# Patient Record
Sex: FEMALE | Race: WHITE | NOT HISPANIC OR LATINO | Employment: FULL TIME | URBAN - METROPOLITAN AREA
[De-identification: names, ages, dates, MRNs, and addresses within clinical notes are randomized per-mention and may not be internally consistent; named-entity substitution may affect disease eponyms.]

---

## 2022-01-11 ENCOUNTER — APPOINTMENT (OUTPATIENT)
Dept: ULTRASOUND IMAGING | Facility: HOSPITAL | Age: 27
End: 2022-01-11

## 2022-01-11 ENCOUNTER — HOSPITAL ENCOUNTER (EMERGENCY)
Facility: HOSPITAL | Age: 27
Discharge: HOME | End: 2022-01-11
Attending: EMERGENCY MEDICINE | Admitting: EMERGENCY MEDICINE

## 2022-01-11 VITALS
RESPIRATION RATE: 16 BRPM | SYSTOLIC BLOOD PRESSURE: 114 MMHG | HEART RATE: 79 BPM | WEIGHT: 195.77 LBS | DIASTOLIC BLOOD PRESSURE: 66 MMHG | BODY MASS INDEX: 33.42 KG/M2 | OXYGEN SATURATION: 97 % | HEIGHT: 64 IN | TEMPERATURE: 98.3 F

## 2022-01-11 DIAGNOSIS — O03.4 INCOMPLETE ABORTION: Primary | ICD-10-CM

## 2022-01-11 LAB
ABO GROUP BLD: NORMAL
BASOPHILS # BLD AUTO: 0.1 10*3/MM3 (ref 0–0.2)
BASOPHILS NFR BLD AUTO: 0.6 % (ref 0–1.5)
DEPRECATED RDW RBC AUTO: 38.1 FL (ref 37–54)
EOSINOPHIL # BLD AUTO: 0 10*3/MM3 (ref 0–0.4)
EOSINOPHIL NFR BLD AUTO: 0.3 % (ref 0.3–6.2)
ERYTHROCYTE [DISTWIDTH] IN BLOOD BY AUTOMATED COUNT: 12.3 % (ref 12.3–15.4)
HCG INTACT+B SERPL-ACNC: NORMAL MIU/ML
HCT VFR BLD AUTO: 36.3 % (ref 34–46.6)
HGB BLD-MCNC: 12.7 G/DL (ref 12–15.9)
LYMPHOCYTES # BLD AUTO: 1.9 10*3/MM3 (ref 0.7–3.1)
LYMPHOCYTES NFR BLD AUTO: 16.5 % (ref 19.6–45.3)
MCH RBC QN AUTO: 29.7 PG (ref 26.6–33)
MCHC RBC AUTO-ENTMCNC: 34.9 G/DL (ref 31.5–35.7)
MCV RBC AUTO: 85.3 FL (ref 79–97)
MONOCYTES # BLD AUTO: 0.7 10*3/MM3 (ref 0.1–0.9)
MONOCYTES NFR BLD AUTO: 6.1 % (ref 5–12)
NEUTROPHILS NFR BLD AUTO: 76.5 % (ref 42.7–76)
NEUTROPHILS NFR BLD AUTO: 8.7 10*3/MM3 (ref 1.7–7)
NRBC BLD AUTO-RTO: 0.1 /100 WBC (ref 0–0.2)
NUMBER OF DOSES: NORMAL
PLATELET # BLD AUTO: 306 10*3/MM3 (ref 140–450)
PMV BLD AUTO: 6.9 FL (ref 6–12)
RBC # BLD AUTO: 4.26 10*6/MM3 (ref 3.77–5.28)
RH BLD: POSITIVE
WBC NRBC COR # BLD: 11.4 10*3/MM3 (ref 3.4–10.8)

## 2022-01-11 PROCEDURE — 84702 CHORIONIC GONADOTROPIN TEST: CPT | Performed by: EMERGENCY MEDICINE

## 2022-01-11 PROCEDURE — 93976 VASCULAR STUDY: CPT

## 2022-01-11 PROCEDURE — 76817 TRANSVAGINAL US OBSTETRIC: CPT

## 2022-01-11 PROCEDURE — 76801 OB US < 14 WKS SINGLE FETUS: CPT

## 2022-01-11 PROCEDURE — 85025 COMPLETE CBC W/AUTO DIFF WBC: CPT | Performed by: EMERGENCY MEDICINE

## 2022-01-11 PROCEDURE — 86900 BLOOD TYPING SEROLOGIC ABO: CPT | Performed by: EMERGENCY MEDICINE

## 2022-01-11 PROCEDURE — 99283 EMERGENCY DEPT VISIT LOW MDM: CPT

## 2022-01-11 PROCEDURE — 86901 BLOOD TYPING SEROLOGIC RH(D): CPT | Performed by: EMERGENCY MEDICINE

## 2022-01-12 NOTE — DISCHARGE INSTRUCTIONS
Follow-up with your OB/GYN as scheduled.  Return to the emergency room for any new or worsening symptoms, bleeding more than one pad per hour, or if you have any other questions or concerns.

## 2022-01-12 NOTE — ED NOTES
Patient states she is 5-6 weeks pregnant.  She feels very weak, faint.  Bleeding started two days ago.  She is not wearing a pad at this time.  Blood is brownish to dark red. No strenuous activity around time bleeding starting.  Patient denies cramping.  She has been nauseous entire pregnancy. Appetite has decreased along with oral intake of fluids.     Didi Amos RN  01/11/22 2050

## 2022-01-12 NOTE — ED PROVIDER NOTES
"Subjective   Chief complaint: Vaginal bleeding    26-year-old female  at approximately 6 weeks gestation presents with vaginal bleeding.  Patient states she has had bleeding over the past 2 days.  Bleeding has been similar to a period.  She denies any abdominal or pelvic pain.  She has had some generalized weakness.  She states she was supposed to have her first OB appointment in 2 days.      History provided by:  Patient      Review of Systems   Constitutional: Negative for fever.   HENT: Negative for congestion.    Respiratory: Negative for cough and shortness of breath.    Cardiovascular: Negative for chest pain.   Gastrointestinal: Negative for abdominal pain, diarrhea and vomiting.   Genitourinary: Positive for vaginal bleeding. Negative for dysuria.   Musculoskeletal: Negative for back pain.   Neurological: Positive for weakness. Negative for headaches.   Psychiatric/Behavioral: Negative for confusion.       No past medical history on file.    Allergies   Allergen Reactions   • Penicillins Hives       No past surgical history on file.    No family history on file.    Social History     Socioeconomic History   • Marital status: Single       /56 (BP Location: Left arm, Patient Position: Lying)   Pulse 78   Temp 98.3 °F (36.8 °C)   Resp 18   Ht 162.6 cm (64\")   Wt 88.8 kg (195 lb 12.3 oz)   SpO2 99%   BMI 33.60 kg/m²       Objective   Physical Exam  Vitals and nursing note reviewed.   Constitutional:       Appearance: Normal appearance. She is well-developed.   HENT:      Head: Normocephalic and atraumatic.   Eyes:      Pupils: Pupils are equal, round, and reactive to light.   Cardiovascular:      Rate and Rhythm: Normal rate and regular rhythm.      Heart sounds: Normal heart sounds.   Pulmonary:      Effort: Pulmonary effort is normal. No respiratory distress.      Breath sounds: Normal breath sounds.   Abdominal:      General: Bowel sounds are normal.      Palpations: Abdomen is soft.      " Tenderness: There is no abdominal tenderness.   Musculoskeletal:         General: Normal range of motion.      Cervical back: Normal range of motion and neck supple.   Skin:     General: Skin is warm and dry.   Neurological:      General: No focal deficit present.      Mental Status: She is alert and oriented to person, place, and time.         Procedures           ED Course      Results for orders placed or performed during the hospital encounter of 22   hCG, Quantitative, Pregnancy    Specimen: Blood   Result Value Ref Range    HCG Quantitative 30,069.00 mIU/mL   CBC Auto Differential    Specimen: Blood   Result Value Ref Range    WBC 11.40 (H) 3.40 - 10.80 10*3/mm3    RBC 4.26 3.77 - 5.28 10*6/mm3    Hemoglobin 12.7 12.0 - 15.9 g/dL    Hematocrit 36.3 34.0 - 46.6 %    MCV 85.3 79.0 - 97.0 fL    MCH 29.7 26.6 - 33.0 pg    MCHC 34.9 31.5 - 35.7 g/dL    RDW 12.3 12.3 - 15.4 %    RDW-SD 38.1 37.0 - 54.0 fl    MPV 6.9 6.0 - 12.0 fL    Platelets 306 140 - 450 10*3/mm3    Neutrophil % 76.5 (H) 42.7 - 76.0 %    Lymphocyte % 16.5 (L) 19.6 - 45.3 %    Monocyte % 6.1 5.0 - 12.0 %    Eosinophil % 0.3 0.3 - 6.2 %    Basophil % 0.6 0.0 - 1.5 %    Neutrophils, Absolute 8.70 (H) 1.70 - 7.00 10*3/mm3    Lymphocytes, Absolute 1.90 0.70 - 3.10 10*3/mm3    Monocytes, Absolute 0.70 0.10 - 0.90 10*3/mm3    Eosinophils, Absolute 0.00 0.00 - 0.40 10*3/mm3    Basophils, Absolute 0.10 0.00 - 0.20 10*3/mm3    nRBC 0.1 0.0 - 0.2 /100 WBC    RhIg Evaluation    Specimen: Blood   Result Value Ref Range    ABO Type A     RH type Positive    Doses of Rh Immune Globulin    Specimen: Blood   Result Value Ref Range    Number of Doses       RhIg is not indicated due to the patient's Rh status     US Ob < 14 Weeks Single or First Gestation    Result Date: 2022   1. Single intrauterine gestation with a crown-rump length correlating with ultrasound age of 7 weeks 3 days but no fetal heart tones, concerning for failed  pregnancy/miscarriage. Recommend correlating with serial beta hCG and short-term follow-up pelvic ultrasound. 2. Unremarkable ultrasound appearance of the ovaries.  Electronically Signed By-Jelani Carter MD On:2022 10:26 PM This report was finalized on  by  Jelani Carter MD.    US Ob Transvaginal    Result Date: 2022   1. Single intrauterine gestation with a crown-rump length correlating with ultrasound age of 7 weeks 3 days but no fetal heart tones, concerning for failed pregnancy/miscarriage. Recommend correlating with serial beta hCG and short-term follow-up pelvic ultrasound. 2. Unremarkable ultrasound appearance of the ovaries.  Electronically Signed By-Jelani Carter MD On:2022 10:26 PM This report was finalized on  by  Jelani Carter MD.                                               MDM   Patient had the above evaluation.  Results were discussed with the patient.  Pregnancy ultrasound is showing a single intrauterine gestation measuring 7 weeks 3 days but with no fetal heart tones concerning for failed pregnancy/miscarriage.  Patient is Rh+.  She already has an appointment to see her OB/GYN in 2 days.  She is stable for discharge.      Final diagnoses:   Incomplete        ED Disposition  ED Disposition     ED Disposition Condition Comment    Discharge Stable           Madelyn Duran MD  [facility] [place] [id number] [phone]    Go in 2 days           Medication List      No changes were made to your prescriptions during this visit.          Camacho Rothman MD  [phone]

## 2022-02-05 ENCOUNTER — HOSPITAL ENCOUNTER (OUTPATIENT)
Facility: HOSPITAL | Age: 27
Setting detail: OBSERVATION
Discharge: HOME | End: 2022-02-06
Attending: EMERGENCY MEDICINE | Admitting: EMERGENCY MEDICINE

## 2022-02-05 ENCOUNTER — APPOINTMENT (OUTPATIENT)
Dept: ULTRASOUND IMAGING | Facility: HOSPITAL | Age: 27
End: 2022-02-05

## 2022-02-05 DIAGNOSIS — O03.9 SPONTANEOUS ABORTION: ICD-10-CM

## 2022-02-05 DIAGNOSIS — N93.9 VAGINAL BLEEDING: Primary | ICD-10-CM

## 2022-02-05 LAB
ABO GROUP BLD: NORMAL
ALBUMIN SERPL-MCNC: 4.2 G/DL (ref 3.5–5.2)
ALBUMIN/GLOB SERPL: 1.4 G/DL
ALP SERPL-CCNC: 71 U/L (ref 39–117)
ALT SERPL W P-5'-P-CCNC: 16 U/L (ref 1–33)
ANION GAP SERPL CALCULATED.3IONS-SCNC: 12 MMOL/L (ref 5–15)
AST SERPL-CCNC: 16 U/L (ref 1–32)
BACTERIA UR QL AUTO: ABNORMAL /HPF
BASOPHILS # BLD AUTO: 0 10*3/MM3 (ref 0–0.2)
BASOPHILS NFR BLD AUTO: 0.3 % (ref 0–1.5)
BILIRUB SERPL-MCNC: 0.3 MG/DL (ref 0–1.2)
BILIRUB UR QL STRIP: NEGATIVE
BLD GP AB SCN SERPL QL: NEGATIVE
BUN SERPL-MCNC: 11 MG/DL (ref 6–20)
BUN/CREAT SERPL: 18.3 (ref 7–25)
CALCIUM SPEC-SCNC: 9.2 MG/DL (ref 8.6–10.5)
CHLORIDE SERPL-SCNC: 101 MMOL/L (ref 98–107)
CLARITY UR: CLEAR
CO2 SERPL-SCNC: 26 MMOL/L (ref 22–29)
COLOR UR: YELLOW
CREAT SERPL-MCNC: 0.6 MG/DL (ref 0.57–1)
DEPRECATED RDW RBC AUTO: 40.3 FL (ref 37–54)
EOSINOPHIL # BLD AUTO: 0.1 10*3/MM3 (ref 0–0.4)
EOSINOPHIL NFR BLD AUTO: 0.6 % (ref 0.3–6.2)
ERYTHROCYTE [DISTWIDTH] IN BLOOD BY AUTOMATED COUNT: 12.9 % (ref 12.3–15.4)
GFR SERPL CREATININE-BSD FRML MDRD: 123 ML/MIN/1.73
GLOBULIN UR ELPH-MCNC: 3 GM/DL
GLUCOSE SERPL-MCNC: 93 MG/DL (ref 65–99)
GLUCOSE UR STRIP-MCNC: NEGATIVE MG/DL
HCG INTACT+B SERPL-ACNC: 806.7 MIU/ML
HCT VFR BLD AUTO: 36.1 % (ref 34–46.6)
HGB BLD-MCNC: 12.4 G/DL (ref 12–15.9)
HGB UR QL STRIP.AUTO: ABNORMAL
HYALINE CASTS UR QL AUTO: ABNORMAL /LPF
KETONES UR QL STRIP: ABNORMAL
LEUKOCYTE ESTERASE UR QL STRIP.AUTO: ABNORMAL
LYMPHOCYTES # BLD AUTO: 1.5 10*3/MM3 (ref 0.7–3.1)
LYMPHOCYTES NFR BLD AUTO: 15.1 % (ref 19.6–45.3)
MCH RBC QN AUTO: 30.4 PG (ref 26.6–33)
MCHC RBC AUTO-ENTMCNC: 34.4 G/DL (ref 31.5–35.7)
MCV RBC AUTO: 88.3 FL (ref 79–97)
MONOCYTES # BLD AUTO: 0.8 10*3/MM3 (ref 0.1–0.9)
MONOCYTES NFR BLD AUTO: 8.3 % (ref 5–12)
NEUTROPHILS NFR BLD AUTO: 7.5 10*3/MM3 (ref 1.7–7)
NEUTROPHILS NFR BLD AUTO: 75.7 % (ref 42.7–76)
NITRITE UR QL STRIP: NEGATIVE
NRBC BLD AUTO-RTO: 0 /100 WBC (ref 0–0.2)
PH UR STRIP.AUTO: 6.5 [PH] (ref 5–8)
PLATELET # BLD AUTO: 288 10*3/MM3 (ref 140–450)
PMV BLD AUTO: 6.8 FL (ref 6–12)
POTASSIUM SERPL-SCNC: 4 MMOL/L (ref 3.5–5.2)
PROT SERPL-MCNC: 7.2 G/DL (ref 6–8.5)
PROT UR QL STRIP: ABNORMAL
RBC # BLD AUTO: 4.09 10*6/MM3 (ref 3.77–5.28)
RBC # UR STRIP: ABNORMAL /HPF
REF LAB TEST METHOD: ABNORMAL
RH BLD: POSITIVE
SARS-COV-2 RNA PNL SPEC NAA+PROBE: NOT DETECTED
SODIUM SERPL-SCNC: 139 MMOL/L (ref 136–145)
SP GR UR STRIP: 1.02 (ref 1–1.03)
SQUAMOUS #/AREA URNS HPF: ABNORMAL /HPF
T&S EXPIRATION DATE: NORMAL
UROBILINOGEN UR QL STRIP: ABNORMAL
WBC # UR STRIP: ABNORMAL /HPF
WBC NRBC COR # BLD: 10 10*3/MM3 (ref 3.4–10.8)

## 2022-02-05 PROCEDURE — G0378 HOSPITAL OBSERVATION PER HR: HCPCS

## 2022-02-05 PROCEDURE — 86900 BLOOD TYPING SEROLOGIC ABO: CPT | Performed by: NURSE PRACTITIONER

## 2022-02-05 PROCEDURE — 84702 CHORIONIC GONADOTROPIN TEST: CPT | Performed by: NURSE PRACTITIONER

## 2022-02-05 PROCEDURE — 80053 COMPREHEN METABOLIC PANEL: CPT | Performed by: NURSE PRACTITIONER

## 2022-02-05 PROCEDURE — 85025 COMPLETE CBC W/AUTO DIFF WBC: CPT | Performed by: NURSE PRACTITIONER

## 2022-02-05 PROCEDURE — C9803 HOPD COVID-19 SPEC COLLECT: HCPCS

## 2022-02-05 PROCEDURE — 76817 TRANSVAGINAL US OBSTETRIC: CPT

## 2022-02-05 PROCEDURE — 81001 URINALYSIS AUTO W/SCOPE: CPT | Performed by: NURSE PRACTITIONER

## 2022-02-05 PROCEDURE — 87635 SARS-COV-2 COVID-19 AMP PRB: CPT | Performed by: EMERGENCY MEDICINE

## 2022-02-05 PROCEDURE — 86850 RBC ANTIBODY SCREEN: CPT | Performed by: NURSE PRACTITIONER

## 2022-02-05 PROCEDURE — 93976 VASCULAR STUDY: CPT

## 2022-02-05 PROCEDURE — 96360 HYDRATION IV INFUSION INIT: CPT

## 2022-02-05 PROCEDURE — 99284 EMERGENCY DEPT VISIT MOD MDM: CPT

## 2022-02-05 PROCEDURE — 86901 BLOOD TYPING SEROLOGIC RH(D): CPT | Performed by: NURSE PRACTITIONER

## 2022-02-05 PROCEDURE — P9612 CATHETERIZE FOR URINE SPEC: HCPCS

## 2022-02-05 RX ORDER — CHOLECALCIFEROL (VITAMIN D3) 125 MCG
5 CAPSULE ORAL NIGHTLY PRN
Status: DISCONTINUED | OUTPATIENT
Start: 2022-02-05 | End: 2022-02-06 | Stop reason: HOSPADM

## 2022-02-05 RX ORDER — ONDANSETRON 2 MG/ML
4 INJECTION INTRAMUSCULAR; INTRAVENOUS EVERY 6 HOURS PRN
Status: DISCONTINUED | OUTPATIENT
Start: 2022-02-05 | End: 2022-02-06 | Stop reason: HOSPADM

## 2022-02-05 RX ORDER — SODIUM CHLORIDE 0.9 % (FLUSH) 0.9 %
10 SYRINGE (ML) INJECTION EVERY 12 HOURS SCHEDULED
Status: DISCONTINUED | OUTPATIENT
Start: 2022-02-05 | End: 2022-02-06 | Stop reason: HOSPADM

## 2022-02-05 RX ORDER — SODIUM CHLORIDE 0.9 % (FLUSH) 0.9 %
10 SYRINGE (ML) INJECTION AS NEEDED
Status: DISCONTINUED | OUTPATIENT
Start: 2022-02-05 | End: 2022-02-06 | Stop reason: HOSPADM

## 2022-02-05 RX ORDER — ACETAMINOPHEN 325 MG/1
650 TABLET ORAL EVERY 4 HOURS PRN
Status: DISCONTINUED | OUTPATIENT
Start: 2022-02-05 | End: 2022-02-06 | Stop reason: HOSPADM

## 2022-02-05 RX ORDER — SODIUM CHLORIDE 9 MG/ML
100 INJECTION, SOLUTION INTRAVENOUS CONTINUOUS
Status: DISCONTINUED | OUTPATIENT
Start: 2022-02-05 | End: 2022-02-06 | Stop reason: HOSPADM

## 2022-02-05 RX ADMIN — SODIUM CHLORIDE 100 ML/HR: 9 INJECTION, SOLUTION INTRAVENOUS at 23:22

## 2022-02-05 RX ADMIN — SODIUM CHLORIDE 1000 ML: 9 INJECTION, SOLUTION INTRAVENOUS at 20:49

## 2022-02-05 RX ADMIN — Medication 10 ML: at 23:22

## 2022-02-05 NOTE — ED PROVIDER NOTES
"Subjective    Chief Complaint   Patient presents with   • Vaginal Bleeding     Pt reports miscarriage on 01/09, states she took \"medication to pass the fetus\" on 01/18.  Pt states she passed it today and has had severe vaginal bleeding, saturating 2-3 pads per hour.       Nikko Valle Jr., MD  No LMP recorded. (Menstrual status: Other).  Allergies   Allergen Reactions   • Penicillins Hives       History obtained by patient  Patient is a 26-year-old female presents to the emergency department with complaint of vaginal bleeding.  Patient reports she has had vaginal bleeding intermittently since January 9.  She reports that time she was seen here in the ED, was diagnosed with a miscarriage.  She followed up with her obstetrician, Dr. Cesar, was given a vaginal medication to \"pass\" but was left.  She reports that on 1/24 she still had an hCG level that was elevated per her obstetrician's office.  She reports that today she had worsening bleeding, and passed tissue at home.  She denies any fever or chills.  She does report feeling dizzy and lightheaded.  She reports at times she feels nauseated.  No vomiting or diarrhea.            Review of Systems   Constitutional: Negative for chills and fever.   Respiratory: Negative for shortness of breath.    Cardiovascular: Negative for chest pain.   Gastrointestinal: Positive for nausea. Negative for abdominal pain, diarrhea and vomiting.   Genitourinary: Positive for pelvic pain and vaginal bleeding. Negative for decreased urine volume, difficulty urinating, dysuria, enuresis, flank pain, frequency, genital sores, hematuria, menstrual problem, urgency, vaginal discharge and vaginal pain.   Skin: Negative for rash.   Neurological: Positive for dizziness and light-headedness. Negative for syncope.       No past medical history on file.    Allergies   Allergen Reactions   • Penicillins Hives       No past surgical history on file.    No family history on file.    Social " History     Socioeconomic History   • Marital status: Single           Objective   Physical Exam  Vitals and nursing note reviewed. Exam conducted with a chaperone present.   Constitutional:       General: She is not in acute distress.     Appearance: Normal appearance. She is not ill-appearing, toxic-appearing or diaphoretic.   HENT:      Head: Normocephalic and atraumatic.      Nose: Nose normal.      Mouth/Throat:      Mouth: Mucous membranes are moist.      Pharynx: Oropharynx is clear.   Eyes:      Extraocular Movements: Extraocular movements intact.      Conjunctiva/sclera: Conjunctivae normal.      Pupils: Pupils are equal, round, and reactive to light.   Cardiovascular:      Rate and Rhythm: Normal rate and regular rhythm.      Heart sounds: Normal heart sounds. No murmur heard.  No friction rub. No gallop.    Pulmonary:      Effort: No respiratory distress.      Breath sounds: Normal breath sounds. No stridor. No wheezing, rhonchi or rales.   Chest:      Chest wall: No tenderness.   Abdominal:      General: Bowel sounds are normal.      Palpations: Abdomen is soft.   Genitourinary:     Comments: She was placed in the lithotomy position external genitalia were found to have no lesions or swelling.  Speculum exam shows clot noted to be coming out of the cervix, with blood in the vaginal vault.  This was cleared with two 4 x 4 gauze and ring forceps, clot was easily removed with ring forceps.  No severe hemorrhage noted.  The patient had no cervical motion tenderness.  Patient had no adnexal tenderness.  Chaperoned with ALLEN Arellano at the bedside.  Musculoskeletal:         General: Normal range of motion.      Cervical back: Normal range of motion and neck supple.   Skin:     General: Skin is warm and dry.      Capillary Refill: Capillary refill takes less than 2 seconds.   Neurological:      Mental Status: She is alert and oriented to person, place, and time.   Psychiatric:         Mood and Affect: Mood  "normal.         Procedures           ED Course  ED Course as of 02/05/22 2108   Sat Feb 05, 2022   1937 Patient an ultrasound [LB]      ED Course User Index  [LB] Yajaira Crystal, APRN           /72 (BP Location: Left arm, Patient Position: Lying)   Pulse 74   Temp 98.4 °F (36.9 °C) (Oral)   Resp 16   Ht 162.6 cm (64\")   Wt 90.4 kg (199 lb 4.7 oz)   SpO2 100%   Breastfeeding Unknown Comment: Recent miscarriage on 01/09  BMI 34.21 kg/m²   Labs Reviewed   URINALYSIS W/ CULTURE IF INDICATED - Abnormal; Notable for the following components:       Result Value    Ketones, UA Trace (*)     Blood, UA Small (1+) (*)     Protein, UA Trace (*)     Leuk Esterase, UA Trace (*)     All other components within normal limits   CBC WITH AUTO DIFFERENTIAL - Abnormal; Notable for the following components:    Lymphocyte % 15.1 (*)     Neutrophils, Absolute 7.50 (*)     All other components within normal limits   URINALYSIS, MICROSCOPIC ONLY - Abnormal; Notable for the following components:    WBC, UA 0-2 (*)     Bacteria, UA Trace (*)     Squamous Epithelial Cells, UA 3-6 (*)     All other components within normal limits   COVID PRE-OP / PRE-PROCEDURE SCREENING ORDER (NO ISOLATION)    Narrative:     The following orders were created for panel order COVID PRE-OP / PRE-PROCEDURE SCREENING ORDER (NO ISOLATION) - Swab, Nasopharynx.  Procedure                               Abnormality         Status                     ---------                               -----------         ------                     COVID-19,CEPHEID/DIEGO,CO...[[id number]]                      In process                   Please view results for these tests on the individual orders.   COVID-19,CEPHEID/DIEGO,COR/SHIRA/PAD/MAURICIO IN-HOUSE,NP SWAB IN TRANSPORT MEDIA 3-4 HR TAT, RT-PCR   COMPREHENSIVE METABOLIC PANEL    Narrative:     GFR Normal >60  Chronic Kidney Disease <60  Kidney Failure <15     HCG, QUANTITATIVE, PREGNANCY    Narrative:     HCG Ranges by " Gestational Age    Females - non-pregnant premenopausal   </= 1mIU/mL HCG  Females - postmenopausal               </= 7mIU/mL HCG    3 Weeks         5.8 -    71.2 mIU/mL  4 Weeks         9.5 -     750 mIU/mL  5 Weeks         217 -   7,138 mIU/mL  6 Weeks         158 -  31,795 mIU/mL  7 Weeks       3,697 - 163,563 mIU/mL  8 Weeks      32,065 - 149,571 mIU/mL  9 Weeks      63,803 - 151,410 mIU/mL  10 Weeks     46,509 - 186,977 mIU/mL  12 Weeks     27,832 - 210,612 mIU/mL  14 Weeks     13,950 -  62,530 mIU/mL  15 Weeks     12,039 -  70,971 mIU/mL  16 Weeks      9,040 -  56,451 mIU/mL  17 Weeks      8,175 -  55,868 mIU/mL  18 Weeks      8,099 -  58,176 mIU/mL  Results may be falsely decreased if patient taking Biotin.     TYPE AND SCREEN   BB ARMBAND CHECK   CBC AND DIFFERENTIAL    Narrative:     The following orders were created for panel order CBC & Differential.  Procedure                               Abnormality         Status                     ---------                               -----------         ------                     CBC Auto Differential[[id number]]        Abnormal            Final result                 Please view results for these tests on the individual orders.     Medications   sodium chloride 0.9 % flush 10 mL (has no administration in time range)   sodium chloride 0.9 % bolus 1,000 mL (1,000 mL Intravenous New Bag 22)     US Ob Transvaginal    Result Date: 2022  1. Echogenic blood clot within the lower uterine segment and cervix likely related to ongoing  progress. 2. No residual gestational sac or apparent retained products of conception. 3. Normal-appearing ovaries with color Doppler flow. No free fluid.  Recommend continued clinical follow-up and follow-up ultrasound as clinically needed.  Electronically Signed By-Darrick Ulloa MD On:2022 8:04 PM This report was finalized on  by  Darrick Ulloa MD.                                           MDM  Appropriate PPE was worn during the duration of the care for this patient while in the emergency department per [facility] Policy    Chart Review--> patient seen here 2022 for pregnancy with vaginal bleeding.  Diagnosed with incomplete  at that time, Rh+.  She was discharged home.    ----Differentials-->  in progress, retained products of conception, sepsis  This list is not all inclusive and does not constitute the entireity of considered causes.     ----ED  Course-->Patient was brought back to the emergency department room for evaluation and placed on appropriate monitoring.    Patient had IV established and blood work obtained    Labs--> hCG 8 of 6.70.  CMP unremarkable.  CBC unremarkable, specifically hemoglobin 12.4, hematocrit 36.1.  UA negative.  Patient is Rh+ per her previous visit.    Was given 1 L of IV fluids.    Please see pelvic exam.    ----Radiology and imaging orders as above, interpreted per ED physician and/or radiologist----> ultrasound as above.    ----Consults--> Dr. Cesar, GYN.  We discussed patient, presentation, and work-up, given that patient is orthostatic, blood pressure 90/54 standing, with tachycardia 109, patient be placed in the ED observation unit for overnight monitoring, hydration, repeat CBC in the morning and GYN consult.    Patient agrees to the current treatment plan.    Vital signs have  been reviewed. Patient is afebrile. Non toxic in appearance. Alert and oriented to person, place, time and situation.                          Final diagnoses:   Vaginal bleeding   Spontaneous        ED Disposition  ED Disposition     ED Disposition Condition Comment    Decision to Admit            No follow-up provider specified.       Medication List      No changes were made to your prescriptions during this visit.          Yajaira Crystal, APRN  [id number]

## 2022-02-06 VITALS
RESPIRATION RATE: 16 BRPM | TEMPERATURE: 98.3 F | SYSTOLIC BLOOD PRESSURE: 108 MMHG | HEART RATE: 102 BPM | OXYGEN SATURATION: 98 % | DIASTOLIC BLOOD PRESSURE: 74 MMHG | WEIGHT: 187.83 LBS | HEIGHT: 64 IN | BODY MASS INDEX: 32.07 KG/M2

## 2022-02-06 PROBLEM — N93.9 VAGINAL BLEEDING: Status: RESOLVED | Noted: 2022-02-05 | Resolved: 2022-02-06

## 2022-02-06 LAB
ANION GAP SERPL CALCULATED.3IONS-SCNC: 9 MMOL/L (ref 5–15)
BASOPHILS # BLD AUTO: 0.1 10*3/MM3 (ref 0–0.2)
BASOPHILS NFR BLD AUTO: 0.6 % (ref 0–1.5)
BUN SERPL-MCNC: 13 MG/DL (ref 6–20)
BUN/CREAT SERPL: 25.5 (ref 7–25)
CALCIUM SPEC-SCNC: 8.6 MG/DL (ref 8.6–10.5)
CHLORIDE SERPL-SCNC: 104 MMOL/L (ref 98–107)
CO2 SERPL-SCNC: 24 MMOL/L (ref 22–29)
CREAT SERPL-MCNC: 0.51 MG/DL (ref 0.57–1)
DEPRECATED RDW RBC AUTO: 40.7 FL (ref 37–54)
EOSINOPHIL # BLD AUTO: 0.1 10*3/MM3 (ref 0–0.4)
EOSINOPHIL NFR BLD AUTO: 0.5 % (ref 0.3–6.2)
ERYTHROCYTE [DISTWIDTH] IN BLOOD BY AUTOMATED COUNT: 12.9 % (ref 12.3–15.4)
GFR SERPL CREATININE-BSD FRML MDRD: 148 ML/MIN/1.73
GLUCOSE SERPL-MCNC: 86 MG/DL (ref 65–99)
HCT VFR BLD AUTO: 29.7 % (ref 34–46.6)
HCT VFR BLD AUTO: 31.9 % (ref 34–46.6)
HGB BLD-MCNC: 10.2 G/DL (ref 12–15.9)
HGB BLD-MCNC: 11 G/DL (ref 12–15.9)
LYMPHOCYTES # BLD AUTO: 2.2 10*3/MM3 (ref 0.7–3.1)
LYMPHOCYTES NFR BLD AUTO: 21.2 % (ref 19.6–45.3)
MCH RBC QN AUTO: 31 PG (ref 26.6–33)
MCHC RBC AUTO-ENTMCNC: 34.5 G/DL (ref 31.5–35.7)
MCV RBC AUTO: 89.9 FL (ref 79–97)
MONOCYTES # BLD AUTO: 0.8 10*3/MM3 (ref 0.1–0.9)
MONOCYTES NFR BLD AUTO: 7.5 % (ref 5–12)
NEUTROPHILS NFR BLD AUTO: 7.3 10*3/MM3 (ref 1.7–7)
NEUTROPHILS NFR BLD AUTO: 70.2 % (ref 42.7–76)
NRBC BLD AUTO-RTO: 0.1 /100 WBC (ref 0–0.2)
PLATELET # BLD AUTO: 261 10*3/MM3 (ref 140–450)
PMV BLD AUTO: 7.2 FL (ref 6–12)
POTASSIUM SERPL-SCNC: 4.1 MMOL/L (ref 3.5–5.2)
RBC # BLD AUTO: 3.55 10*6/MM3 (ref 3.77–5.28)
SODIUM SERPL-SCNC: 137 MMOL/L (ref 136–145)
WBC NRBC COR # BLD: 10.4 10*3/MM3 (ref 3.4–10.8)

## 2022-02-06 PROCEDURE — 85025 COMPLETE CBC W/AUTO DIFF WBC: CPT | Performed by: EMERGENCY MEDICINE

## 2022-02-06 PROCEDURE — 80048 BASIC METABOLIC PNL TOTAL CA: CPT | Performed by: EMERGENCY MEDICINE

## 2022-02-06 PROCEDURE — 36415 COLL VENOUS BLD VENIPUNCTURE: CPT | Performed by: EMERGENCY MEDICINE

## 2022-02-06 PROCEDURE — G0378 HOSPITAL OBSERVATION PER HR: HCPCS

## 2022-02-06 PROCEDURE — 85018 HEMOGLOBIN: CPT | Performed by: NURSE PRACTITIONER

## 2022-02-06 PROCEDURE — 96361 HYDRATE IV INFUSION ADD-ON: CPT

## 2022-02-06 PROCEDURE — 85014 HEMATOCRIT: CPT | Performed by: NURSE PRACTITIONER

## 2022-02-06 RX ORDER — DOXYCYCLINE HYCLATE 50 MG/1
324 CAPSULE, GELATIN COATED ORAL
Qty: 30 TABLET | Refills: 1 | Status: SHIPPED | OUTPATIENT
Start: 2022-02-06

## 2022-02-06 RX ADMIN — Medication 10 ML: at 08:36

## 2022-02-06 RX ADMIN — SODIUM CHLORIDE 100 ML/HR: 9 INJECTION, SOLUTION INTRAVENOUS at 08:45

## 2022-02-06 NOTE — H&P
"[facility] H&P    Patient Name: Cornelia Paulson  : 1995  MRN: [id number]  Primary Care Physician: Nikko Valle Jr., MD  Date of admission: 2022     Patient Care Team:  Nikko Valle Jr., MD as PCP - General (Family Medicine)          Subjective   History Present Illness     Chief Complaint:   Chief Complaint   Patient presents with   • Vaginal Bleeding     Pt reports miscarriage on , states she took \"medication to pass the fetus\" on .  Pt states she passed it today and has had severe vaginal bleeding, saturating 2-3 pads per hour.           Ms. Paulson is a 26 y.o.  presents to [facility] complaining of       26-year-old female presents to the ER with a chief complaint of vaginal bleeding with blood clots and likely expelled products of conception.  The patient was seen at this facility's ER on 2022 with her uterine gestation proximately 7 weeks and 3 days but no fetal heart tones concerning for miscarriage.  Patient noted to be Rh+. The patient reports follow-up with OB/GYN 2022 at which time the patient reports she was was given medicine to assist with expelling products of conception.  The patient states that she had some heavy bleeding which later eased up.  However, yesterday she passed a large blood clot which looked like it had tissue attached and subsequent moderate-sized blood clot with what looked like a small fetus attached.  She continued to have heavy vaginal bleeding and began to feel dizzy and lightheaded at which time she came to the emergency room for valuation.  This a.m. the patient reports that her vaginal bleeding has greatly decreased.  The patient has had 1 prior pregnancy with a daughter.    Review of records with summary: Patient seen in this facility's ER       Review of Systems   Gastrointestinal: Negative for abdominal pain, hematochezia, melena, nausea and vomiting.   Genitourinary: Negative for dysuria.        Vaginal " bleeding   All other systems reviewed and are negative.          Personal History     Past Medical History:   Past Medical History:   Diagnosis Date   • Endometriosis        Surgical History:    No past surgical history on file.        Family History: family history is not on file. Otherwise pertinent FHx was reviewed and unremarkable.     Social History:  reports that she has never smoked. She does not have any smokeless tobacco history on file. She reports that she does not use drugs.      Medications: none       Allergies:    Allergies   Allergen Reactions   • Penicillins Hives       Objective   Objective     Vital Signs  Temp:  [98.1 °F (36.7 °C)-98.6 °F (37 °C)] 98.6 °F (37 °C)  Heart Rate:  [] 70  Resp:  [15-16] 16  BP: ()/(36-78) 105/66  SpO2:  [98 %-100 %] 98 %  on   ;   Device (Oxygen Therapy): room air  Body mass index is 32.24 kg/m².    Physical Exam  Vitals and nursing note reviewed.   Constitutional:       Appearance: Normal appearance. She is not ill-appearing.   HENT:      Head: Normocephalic and atraumatic.      Right Ear: External ear normal.      Left Ear: External ear normal.      Nose: Nose normal. No congestion or rhinorrhea.      Mouth/Throat:      Mouth: Mucous membranes are moist.   Eyes:      General: No scleral icterus.        Right eye: No discharge.         Left eye: No discharge.      Extraocular Movements: Extraocular movements intact.      Conjunctiva/sclera: Conjunctivae normal.      Pupils: Pupils are equal, round, and reactive to light.   Cardiovascular:      Rate and Rhythm: Normal rate and regular rhythm.      Pulses: Normal pulses.      Heart sounds: Normal heart sounds.   Pulmonary:      Effort: Pulmonary effort is normal.      Breath sounds: Normal breath sounds.   Abdominal:      General: Bowel sounds are normal.      Palpations: Abdomen is soft.   Musculoskeletal:         General: Normal range of motion.      Cervical back: Normal range of motion and neck supple.    Skin:     General: Skin is warm and dry.      Capillary Refill: Capillary refill takes less than 2 seconds.      Coloration: Skin is not pale.   Neurological:      General: No focal deficit present.      Mental Status: She is alert and oriented to person, place, and time.   Psychiatric:         Mood and Affect: Mood normal.         Behavior: Behavior normal.         Thought Content: Thought content normal.         Judgment: Judgment normal.           Results Review:  I have personally reviewed most recent lab results and radiology images and interpretations and agree with findings.    Results from last 7 days   Lab Units 02/06/22  0303   WBC 10*3/mm3 10.40   HEMOGLOBIN g/dL 11.0*   HEMATOCRIT % 31.9*   PLATELETS 10*3/mm3 261     Results from last 7 days   Lab Units 02/06/22  0303 02/05/22  1913 02/05/22  1913   SODIUM mmol/L 137   < > 139   POTASSIUM mmol/L 4.1   < > 4.0   CHLORIDE mmol/L 104   < > 101   CO2 mmol/L 24.0   < > 26.0   BUN mg/dL 13   < > 11   CREATININE mg/dL 0.51*   < > 0.60   GLUCOSE mg/dL 86   < > 93   CALCIUM mg/dL 8.6   < > 9.2   ALT (SGPT) U/L  --   --  16   AST (SGOT) U/L  --   --  16    < > = values in this interval not displayed.     Estimated Creatinine Clearance: 179.6 mL/min (A) (by C-G formula based on SCr of 0.51 mg/dL (L)).  Brief Urine Lab Results  (Last result in the past 365 days)      Color   Clarity   Blood   Leuk Est   Nitrite   Protein   CREAT   Urine HCG        02/05/22 2000 Yellow  Comment: Result checked    Clear   Small (1+)   Trace   Negative   Trace                 Microbiology Results (last 10 days)     Procedure Component Value - Date/Time    COVID PRE-OP / PRE-PROCEDURE SCREENING ORDER (NO ISOLATION) - Swab, Nasopharynx [[id number]]  (Normal) Collected: 02/05/22 2105    Lab Status: Final result Specimen: Swab from Nasopharynx Updated: 02/05/22 2155    Narrative:      The following orders were created for panel order COVID PRE-OP / PRE-PROCEDURE SCREENING ORDER (NO  ISOLATION) - Swab, Nasopharynx.  Procedure                               Abnormality         Status                     ---------                               -----------         ------                     COVID-19,CEPHEID/DIEGO,CO...[[id number]]  Normal              Final result                 Please view results for these tests on the individual orders.    COVID-19,CEPHEID/DIEGO,COR/SHIRA/PAD/MAURICIO IN-HOUSE(OR EMERGENT/ADD-ON),NP SWAB IN TRANSPORT MEDIA 3-4 HR TAT, RT-PCR - Swab, Nasopharynx [[id number]]  (Normal) Collected: 22    Lab Status: Final result Specimen: Swab from Nasopharynx Updated: 22     COVID19 Not Detected    Narrative:      Fact sheet for providers: https://www.fda.gov/media/169542/download     Fact sheet for patients: https://www.fda.gov/media/112392/download  Fact sheet for providers: https://www.fda.gov/media/319170/download    Fact sheet for patients: https://www.fda.gov/media/492201/download    Test performed by PCR.          ECG/EMG Results (most recent)     None                  US Ob Transvaginal    Result Date: 2022  1. Echogenic blood clot within the lower uterine segment and cervix likely related to ongoing  progress. 2. No residual gestational sac or apparent retained products of conception. 3. Normal-appearing ovaries with color Doppler flow. No free fluid.  Recommend continued clinical follow-up and follow-up ultrasound as clinically needed.  Electronically Signed By-Darrick Ulloa MD On:2022 8:04 PM This report was finalized on  by  Darrick Ulloa MD.        Estimated Creatinine Clearance: 179.6 mL/min (A) (by C-G formula based on SCr of 0.51 mg/dL (L)).    Assessment/Plan   Assessment/Plan       Active Hospital Problems    Diagnosis  POA   • Vaginal bleeding [N93.9]  Yes      Resolved Hospital Problems   No resolved problems to display.       Vaginal bleeding with recent spontaneous : OB/GYN consult; serial  hemoglobin    Dizziness--likley secondary to volume deficit related to blood loss: IV fluids; Prepeat orthostatic vital signs    Anemia, mild, hemoglobin 11.4--secondary to vaginal blood loss with comparison 12.4 on 2/5/2022: Repeat H&H at noon      VTE Prophylaxis -   Mechanical Order History:      Ordered        02/05/22 2216  Place Sequential Compression Device  Once            02/05/22 2216  Maintain Sequential Compression Device  Continuous                    Pharmalogical Order History:     None          CODE STATUS:    Code Status and Medical Interventions:   Ordered at: 02/05/22 2053     Level Of Support Discussed With:    Patient     Code Status (Patient has no pulse and is not breathing):    CPR (Attempt to Resuscitate)     Medical Interventions (Patient has pulse or is breathing):    Full Support       This patient has been examined wearing personal protective equipment.     I discussed the patient's findings and my recommendations with patient and nursing staff.      Signature:Electronically signed by YUNIOR Sotelo, 02/06/22, 9:01 AM EST.

## 2022-02-06 NOTE — PLAN OF CARE
Problem: Adult Inpatient Plan of Care  Goal: Plan of Care Review  Outcome: Ongoing, Progressing  Flowsheets (Taken 2/6/2022 1304)  Progress: improving  Plan of Care Reviewed With:   patient   father  Outcome Summary: patient admitted for a spontaneous miscarriage and vaginal bleeding due to this, Dr. Duran with Ob/gyn saw the patient and advised to follow up in 2 weeks, to be off work this week, retuen to hospital or office if any increased vaginal bleeding occurs and pelvic rest until then, and to follow up in 2 weeks. VSS, SR/SR PVCs on the monitor, no pain or any other complaints, pt will be discharged home today, will monitor, pt stable at this time  Goal: Patient-Specific Goal (Individualized)  Outcome: Ongoing, Progressing  Goal: Absence of Hospital-Acquired Illness or Injury  Outcome: Ongoing, Progressing  Intervention: Identify and Manage Fall Risk  Recent Flowsheet Documentation  Taken 2/6/2022 1304 by Jaquelin Rowley RN  Safety Promotion/Fall Prevention: safety round/check completed  Taken 2/6/2022 1123 by Jaquelin Rowley RN  Safety Promotion/Fall Prevention: safety round/check completed  Taken 2/6/2022 0905 by Jaquelin Rowley RN  Safety Promotion/Fall Prevention: safety round/check completed  Taken 2/6/2022 0705 by Jaquelin Rowley RN  Safety Promotion/Fall Prevention:   safety round/check completed   assistive device/personal items within reach   clutter free environment maintained   lighting adjusted   nonskid shoes/slippers when out of bed   room organization consistent  Intervention: Prevent Skin Injury  Recent Flowsheet Documentation  Taken 2/6/2022 0705 by Jaquelin Rowley RN  Body Position:   supine   sitting up in bed   position changed independently   position maintained  Goal: Optimal Comfort and Wellbeing  Outcome: Ongoing, Progressing  Intervention: Provide Person-Centered Care  Recent Flowsheet Documentation  Taken 2/6/2022 0705 by Jaquelin Rowley RN  Trust Relationship/Rapport:   care  explained   choices provided   emotional support provided   empathic listening provided   questions encouraged   questions answered   reassurance provided   thoughts/feelings acknowledged  Goal: Readiness for Transition of Care  Outcome: Ongoing, Progressing     Problem:  Loss  Goal: Optimal Adjustment to Loss  Outcome: Ongoing, Progressing  Intervention: Promote Sense of Control, Involvement and Inclusion  Recent Flowsheet Documentation  Taken 2022 by Jaquelin Rowley, RN  Loss/Grief Support: (RN encouraged patient to let us know if she needed anything, even if she needed someone to talk to, patient was appreciative, and will let us know if needed) other (see comments)  Intervention: Support Patient and Family Grieving Process  Recent Flowsheet Documentation  Taken 2022 by Jaquelin Rowley, RN  Supportive Measures:   active listening utilized   verbalization of feelings encouraged  Family/Support System Care:   support provided   self-care encouraged   Goal Outcome Evaluation:  Plan of Care Reviewed With: patient, father        Progress: improving  Outcome Summary: patient admitted for a spontaneous miscarriage and vaginal bleeding due to this, Dr. Duran with Ob/gyn saw the patient and advised to follow up in 2 weeks, to be off work this week, retuen to hospital or office if any increased vaginal bleeding occurs and pelvic rest until then, and to follow up in 2 weeks. VSS, SR/SR PVCs on the monitor, no pain or any other complaints, pt will be discharged home today, will monitor, pt stable at this time

## 2022-02-06 NOTE — PLAN OF CARE
Problem: Adult Inpatient Plan of Care  Goal: Plan of Care Review  2/6/2022 1349 by Jaquelin Rowley RN  Outcome: Met  Flowsheets (Taken 2/6/2022 1349)  Progress: improving  Plan of Care Reviewed With:   patient   father  Outcome Summary: patient will be discharged home, work note sent  2/6/2022 1313 by Jaquelin Rowley, LEANN  Outcome: Ongoing, Progressing  Flowsheets (Taken 2/6/2022 1304)  Progress: improving  Plan of Care Reviewed With:   patient   father  Outcome Summary: patient admitted for a spontaneous miscarriage and vaginal bleeding due to this, Dr. Duran with Ob/gyn saw the patient and advised to follow up in 2 weeks, to be off work this week, retuen to hospital or office if any increased vaginal bleeding occurs and pelvic rest until then, and to follow up in 2 weeks. VSS, SR/SR PVCs on the monitor, no pain or any other complaints, pt will be discharged home today, will monitor, pt stable at this time  Goal: Patient-Specific Goal (Individualized)  2/6/2022 1349 by Jaquelin Rowley RN  Outcome: Met  2/6/2022 1313 by Jaquelin Rowley RN  Outcome: Ongoing, Progressing  Goal: Absence of Hospital-Acquired Illness or Injury  2/6/2022 1349 by Jaquelin Rowley RN  Outcome: Met  2/6/2022 1313 by Jaquelin Rowley RN  Outcome: Ongoing, Progressing  Intervention: Identify and Manage Fall Risk  Recent Flowsheet Documentation  Taken 2/6/2022 1304 by Jaquelin Rowley, RN  Safety Promotion/Fall Prevention: safety round/check completed  Taken 2/6/2022 1123 by Jaquelin Rowley RN  Safety Promotion/Fall Prevention: safety round/check completed  Taken 2/6/2022 0905 by Jaquelin Rowley RN  Safety Promotion/Fall Prevention: safety round/check completed  Taken 2/6/2022 0705 by Jaquelin Rowley, RN  Safety Promotion/Fall Prevention:   safety round/check completed   assistive device/personal items within reach   clutter free environment maintained   lighting adjusted   nonskid shoes/slippers when out of bed   room organization  consistent  Intervention: Prevent Skin Injury  Recent Flowsheet Documentation  Taken 2022 0705 by Jaquelin Rowley RN  Body Position:   supine   sitting up in bed   position changed independently   position maintained  Goal: Optimal Comfort and Wellbeing  2022 1349 by Jaquelin Rowley RN  Outcome: Met  2022 1313 by Jaquelin Rowley RN  Outcome: Ongoing, Progressing  Intervention: Provide Person-Centered Care  Recent Flowsheet Documentation  Taken 2022 07 by Jaquelin Rowley RN  Trust Relationship/Rapport:   care explained   choices provided   emotional support provided   empathic listening provided   questions encouraged   questions answered   reassurance provided   thoughts/feelings acknowledged  Goal: Readiness for Transition of Care  2022 1349 by Jaquelin Rowley RN  Outcome: Met  2022 1313 by Jaquelin Rowley RN  Outcome: Ongoing, Progressing     Problem:  Loss  Goal: Optimal Adjustment to Loss  2022 1349 by Jaquelin Rowley RN  Outcome: Met  2022 1313 by Jaquelin Rowley RN  Outcome: Ongoing, Progressing  Intervention: Promote Sense of Control, Involvement and Inclusion  Recent Flowsheet Documentation  Taken 2022 07 by Jaquelin Rowley RN  Loss/Grief Support: (RN encouraged patient to let us know if she needed anything, even if she needed someone to talk to, patient was appreciative, and will let us know if needed) other (see comments)  Intervention: Support Patient and Family Grieving Process  Recent Flowsheet Documentation  Taken 2022 0705 by Jaquelin Rowley RN  Supportive Measures:   active listening utilized   verbalization of feelings encouraged  Family/Support System Care:   support provided   self-care encouraged   Goal Outcome Evaluation:  Plan of Care Reviewed With: patient, father        Progress: improving  Outcome Summary: patient will be discharged home, work note sent

## 2022-02-06 NOTE — DISCHARGE INSTR - APPOINTMENTS
Follow up with Dr. Duran, Ob/gyn in 2 weeks   Return to hospital or office if any increased vaginal bleeding occurs  Pelvic rest as instructed by Dr. Duran  Off work this week--work note given

## 2022-02-06 NOTE — DISCHARGE SUMMARY
[facility]  DISCHARGE SUMMARY        Prepared For PCP:  Nikko Valle Jr., MD    Patient Name: Cornelia Paulson  : 1995  MRN: [id number]      Date of Admission:   2022    Date of Discharge:  2022    Length of stay:  LOS: 0 days     Hospital Course     Presenting Problem:   Vaginal bleeding [N93.9]  Spontaneous  [O03.9]      Active Hospital Problems   No active problems to display.      Resolved Hospital Problems    Diagnosis Date Resolved POA   • Vaginal bleeding [N93.9] 2022 Yes           Hospital Course:  Cornelia Paulson is a 26 y.o. female presented with a chief complaint of vaginal bleeding.  Patient was seen in the emergency room and diagnosed with probable pregnancy loss with just over 7 weeks.  Patient followed up with OB/GYN and was prescribed medication to help expel products of conception with initial improvement after taking her medication.  However, yesterday patient had increased bleeding and was concerned and came to the emergency room for evaluation.  The patient had transvaginal ultrasound with identification of blood clots in the lower uterus and vaginal vault but no evidence of products of conception.  Hemoglobin remained stable with mild anemia.  She will be discharged with 2 months of iron supplementation.  The patient was seen by OB/GYN with recommendation for follow-up in office in 2 weeks.    Recommendation for Outpatient Providers:     Monitor hemoglobin        Reasons For Change In Medications and Indications for New Medications:    Daily iron supplementation for mild anemia x2 months.    Day of Discharge     HPI:   26-year-old female presents to the ER with a chief complaint of vaginal bleeding with blood clots and likely expelled products of conception.  The patient was seen at this facility's ER on 2022 with her uterine gestation proximately 7 weeks and 3 days but no fetal heart tones concerning for miscarriage.  Patient noted to be  Rh+. The patient reports follow-up with OB/GYN 1/24/2022 at which time the patient reports she was was given medicine to assist with expelling products of conception.  The patient states that she had some heavy bleeding which later eased up.  However, yesterday she passed a large blood clot which looked like it had tissue attached and subsequent moderate-sized blood clot with what looked like a small fetus attached.  She continued to have heavy vaginal bleeding and began to feel dizzy and lightheaded at which time she came to the emergency room for valuation.  This a.m. the patient reports that her vaginal bleeding has greatly decreased.  The patient has had 1 prior pregnancy with a daughter.     Review of records with summary: Patient seen in this facility's ER     Vital Signs:   Temp:  [98.1 °F (36.7 °C)-98.6 °F (37 °C)] 98.3 °F (36.8 °C)  Heart Rate:  [] 102  Resp:  [15-16] 16  BP: ()/(36-78) 108/74     ROS:  Review of Systems   All other systems reviewed and are negative.      Physical Exam  Vitals and nursing note reviewed.   Constitutional:       Appearance: Normal appearance. She is not ill-appearing.   HENT:      Head: Normocephalic and atraumatic.      Right Ear: External ear normal.      Left Ear: External ear normal.      Nose: Nose normal. No congestion or rhinorrhea.      Mouth/Throat:      Mouth: Mucous membranes are moist.   Eyes:      General: No scleral icterus.        Right eye: No discharge.         Left eye: No discharge.      Extraocular Movements: Extraocular movements intact.      Conjunctiva/sclera: Conjunctivae normal.      Pupils: Pupils are equal, round, and reactive to light.   Cardiovascular:      Rate and Rhythm: Normal rate and regular rhythm.      Pulses: Normal pulses.      Heart sounds: Normal heart sounds.   Pulmonary:      Effort: Pulmonary effort is normal.      Breath sounds: Normal breath sounds.   Abdominal:      General: Bowel sounds are normal.      Palpations:  Abdomen is soft.   Musculoskeletal:         General: Normal range of motion.      Cervical back: Normal range of motion and neck supple.   Skin:     General: Skin is warm and dry.      Capillary Refill: Capillary refill takes less than 2 seconds.      Coloration: Skin is not pale.   Neurological:      General: No focal deficit present.      Mental Status: She is alert and oriented to person, place, and time.   Psychiatric:         Mood and Affect: Mood normal.         Behavior: Behavior normal.         Thought Content: Thought content normal.         Judgment: Judgment normal.   Pertinent  and/or Most Recent Results     Results from last 7 days   Lab Units 02/06/22  0303 02/05/22  1913   WBC 10*3/mm3 10.40 10.00   HEMOGLOBIN g/dL 11.0* 12.4   HEMATOCRIT % 31.9* 36.1   PLATELETS 10*3/mm3 261 288   SODIUM mmol/L 137 139   POTASSIUM mmol/L 4.1 4.0   CHLORIDE mmol/L 104 101   CO2 mmol/L 24.0 26.0   BUN mg/dL 13 11   CREATININE mg/dL 0.51* 0.60   GLUCOSE mg/dL 86 93   CALCIUM mg/dL 8.6 9.2     Results from last 7 days   Lab Units 02/05/22  1913   BILIRUBIN mg/dL 0.3   ALK PHOS U/L 71   ALT (SGPT) U/L 16   AST (SGOT) U/L 16           Invalid input(s): TG, LDLCALC, LDLREALC        Brief Urine Lab Results  (Last result in the past 365 days)      Color   Clarity   Blood   Leuk Est   Nitrite   Protein   CREAT   Urine HCG        02/05/22 2000 Yellow  Comment: Result checked    Clear   Small (1+)   Trace   Negative   Trace                 Microbiology Results Abnormal     Procedure Component Value - Date/Time    COVID PRE-OP / PRE-PROCEDURE SCREENING ORDER (NO ISOLATION) - Swab, Nasopharynx [[id number]]  (Normal) Collected: 02/05/22 2105    Lab Status: Final result Specimen: Swab from Nasopharynx Updated: 02/05/22 2155    Narrative:      The following orders were created for panel order COVID PRE-OP / PRE-PROCEDURE SCREENING ORDER (NO ISOLATION) - Swab, Nasopharynx.  Procedure                               Abnormality          Status                     ---------                               -----------         ------                     COVID-19,CEPHEID/DIEGO,CO...[[id number]]  Normal              Final result                 Please view results for these tests on the individual orders.    COVID-19,CEPHEID/DIEGO,COR/SHIRA/PAD/MAURICIO IN-HOUSE(OR EMERGENT/ADD-ON),NP SWAB IN TRANSPORT MEDIA 3-4 HR TAT, RT-PCR - Swab, Nasopharynx [[id number]]  (Normal) Collected: 22    Lab Status: Final result Specimen: Swab from Nasopharynx Updated: 22     COVID19 Not Detected    Narrative:      Fact sheet for providers: https://www.fda.gov/media/298454/download     Fact sheet for patients: https://www.fda.gov/media/614442/download  Fact sheet for providers: https://www.fda.gov/media/188740/download    Fact sheet for patients: https://www.fda.gov/media/179208/download    Test performed by PCR.          US Ob < 14 Weeks Single or First Gestation    Result Date: 2022  Impression:  1. Single intrauterine gestation with a crown-rump length correlating with ultrasound age of 7 weeks 3 days but no fetal heart tones, concerning for failed pregnancy/miscarriage. Recommend correlating with serial beta hCG and short-term follow-up pelvic ultrasound. 2. Unremarkable ultrasound appearance of the ovaries.  Electronically Signed By-Jelani Carter MD On:2022 10:26 PM This report was finalized on [id number] by  Jelani Carter MD.    US Ob Transvaginal    Result Date: 2022  Impression: 1. Echogenic blood clot within the lower uterine segment and cervix likely related to ongoing  progress. 2. No residual gestational sac or apparent retained products of conception. 3. Normal-appearing ovaries with color Doppler flow. No free fluid.  Recommend continued clinical follow-up and follow-up ultrasound as clinically needed.  Electronically Signed By-Darrick Ulloa MD On:2022 8:04 PM This report was finalized on [id number] by  Darrick  MD Laila.    US Ob Transvaginal    Result Date: 1/11/2022  Impression:  1. Single intrauterine gestation with a crown-rump length correlating with ultrasound age of 7 weeks 3 days but no fetal heart tones, concerning for failed pregnancy/miscarriage. Recommend correlating with serial beta hCG and short-term follow-up pelvic ultrasound. 2. Unremarkable ultrasound appearance of the ovaries.  Electronically Signed By-Jelani Carter MD On:1/11/2022 10:26 PM This report was finalized on [id number] by  Jelani Carter MD.                          Test Results Pending at Discharge        Procedures Performed           Consults:   Consults     Date and Time Order Name Status Description    2/6/2022 12:34 AM Inpatient Obstetrics / Gynecology Consult      2/5/2022  8:22 PM OB/GYN (on-call MD unless specified) Completed             Discharge Details        Discharge Medications      New Medications      Instructions Start Date   ferrous gluconate 324 MG tablet  Commonly known as: FERGON   324 mg, Oral, Daily With Breakfast             Allergies   Allergen Reactions   • Penicillins Hives         Discharge Disposition:  Home or Self Care    Diet:  Hospital:  Diet Order   Procedures   • Diet Regular         Discharge Activity:         CODE STATUS:    Code Status and Medical Interventions:   Ordered at: 02/05/22 2053     Level Of Support Discussed With:    Patient     Code Status (Patient has no pulse and is not breathing):    CPR (Attempt to Resuscitate)     Medical Interventions (Patient has pulse or is breathing):    Full Support         Follow-up Appointments  No future appointments.          Condition on Discharge:      Stable      This patient has been examined wearing appropriate Personal Protective Equipment. 02/06/22      Electronically signed by YUNIOR Sotelo, 02/06/22, 1:37 PM EST.      Time: I spent  60  minutes on this discharge activity which included face-to-face encounter with the patient/reviewing the data  in the system/coordination of the care with the nursing staff as well as consultants/documentation/entering orders.

## 2022-02-06 NOTE — CONSULTS
Referring Provider: Hospitalist  Reason for Consultation: Missed AB    Patient Care Team:  Nikko Valle Jr., MD as PCP - General (Family Medicine)    Chief complaint vaginal bleeding    Subjective .     History of present illness: 26-year-old -0-0-1 female with known incomplete AB.  Treated in the office with Cytotec.  Patient presented to the ER last night with vaginal bleeding.    Objective     Vital Signs   Vitals:    22 0625 22 0956 22 0958 22 0959   BP: 105/66 103/67 107/74 108/74   BP Location: Right arm Left arm Left arm Left arm   Patient Position: Lying Lying Sitting Standing   Pulse: 70 70 103 102   Resp: 16 16     Temp: 98.6 °F (37 °C) 98.3 °F (36.8 °C)     TempSrc: Oral Oral     SpO2: 98% 98%     Weight:       Height:         Temp (24hrs), Av.4 °F (36.9 °C), Min:98.1 °F (36.7 °C), Max:98.6 °F (37 °C)      Physical Exam:     General Appearance:    Alert, cooperative, in no acute distress   Abdomen:     Normal bowel sounds, no masses, no organomegaly, soft        non-tender, non-distended, no guarding, no rebound                 tenderness   Genitalia:   No active genital bleeding.     Lab Results:  Lab Results (last 48 hours)     Procedure Component Value Units Date/Time    Basic Metabolic Panel [[id number]]  (Abnormal) Collected: 22    Specimen: Blood Updated: 22 034     Glucose 86 mg/dL      BUN 13 mg/dL      Creatinine 0.51 mg/dL      Sodium 137 mmol/L      Potassium 4.1 mmol/L      Chloride 104 mmol/L      CO2 24.0 mmol/L      Calcium 8.6 mg/dL      eGFR Non African Amer 148 mL/min/1.73      BUN/Creatinine Ratio 25.5     Anion Gap 9.0 mmol/L     Narrative:      GFR Normal >60  Chronic Kidney Disease <60  Kidney Failure <15      CBC Auto Differential [[id number]]  (Abnormal) Collected: 22    Specimen: Blood Updated: 22 032     WBC 10.40 10*3/mm3      RBC 3.55 10*6/mm3      Hemoglobin 11.0 g/dL      Hematocrit 31.9 %      MCV 89.9  fL      MCH 31.0 pg      MCHC 34.5 g/dL      RDW 12.9 %      RDW-SD 40.7 fl      MPV 7.2 fL      Platelets 261 10*3/mm3      Neutrophil % 70.2 %      Lymphocyte % 21.2 %      Monocyte % 7.5 %      Eosinophil % 0.5 %      Basophil % 0.6 %      Neutrophils, Absolute 7.30 10*3/mm3      Lymphocytes, Absolute 2.20 10*3/mm3      Monocytes, Absolute 0.80 10*3/mm3      Eosinophils, Absolute 0.10 10*3/mm3      Basophils, Absolute 0.10 10*3/mm3      nRBC 0.1 /100 WBC     COVID PRE-OP / PRE-PROCEDURE SCREENING ORDER (NO ISOLATION) - Swab, Nasopharynx [[id number]]  (Normal) Collected: 02/05/22 2105    Specimen: Swab from Nasopharynx Updated: 02/05/22 2155    Narrative:      The following orders were created for panel order COVID PRE-OP / PRE-PROCEDURE SCREENING ORDER (NO ISOLATION) - Swab, Nasopharynx.  Procedure                               Abnormality         Status                     ---------                               -----------         ------                     COVID-19,CEPHEID/DIEGO,CO...[[id number]]  Normal              Final result                 Please view results for these tests on the individual orders.    COVID-19,CEPHEID/DIEGO,COR/SHIRA/PAD/MAURICIO IN-HOUSE(OR EMERGENT/ADD-ON),NP SWAB IN TRANSPORT MEDIA 3-4 HR TAT, RT-PCR - Swab, Nasopharynx [[id number]]  (Normal) Collected: 02/05/22 2105    Specimen: Swab from Nasopharynx Updated: 02/05/22 2155     COVID19 Not Detected    Narrative:      Fact sheet for providers: https://www.fda.gov/media/561123/download     Fact sheet for patients: https://www.fda.gov/media/346549/download  Fact sheet for providers: https://www.fda.gov/media/128426/download    Fact sheet for patients: https://www.fda.gov/media/551141/download    Test performed by PCR.    Urinalysis, Microscopic Only - Urine, Catheter In/Out [[id number]]  (Abnormal) Collected: 02/05/22 2000    Specimen: Urine, Catheter In/Out Updated: 02/05/22 2023     RBC, UA None Seen /HPF      WBC, UA 0-2 /HPF      Bacteria, UA  Trace /HPF      Squamous Epithelial Cells, UA 3-6 /HPF      Hyaline Casts, UA None Seen /LPF      Methodology Manual Light Microscopy    Urinalysis With Culture If Indicated - Urine, Catheter In/Out [[id number]]  (Abnormal) Collected: 02/05/22 2000    Specimen: Urine, Catheter In/Out Updated: 02/05/22 2014     Color, UA Yellow     Comment: Result checked         Appearance, UA Clear     pH, UA 6.5     Specific Gravity, UA 1.025     Glucose, UA Negative     Ketones, UA Trace     Bilirubin, UA Negative     Blood, UA Small (1+)     Protein, UA Trace     Leuk Esterase, UA Trace     Nitrite, UA Negative     Urobilinogen, UA 1.0 E.U./dL    hCG, Quantitative, Pregnancy [[id number]] Collected: 02/05/22 1913    Specimen: Blood Updated: 02/05/22 1955     HCG Quantitative 806.70 mIU/mL     Narrative:      HCG Ranges by Gestational Age    Females - non-pregnant premenopausal   </= 1mIU/mL HCG  Females - postmenopausal               </= 7mIU/mL HCG    3 Weeks         5.8 -    71.2 mIU/mL  4 Weeks         9.5 -     750 mIU/mL  5 Weeks         217 -   7,138 mIU/mL  6 Weeks         158 -  31,795 mIU/mL  7 Weeks       3,697 - 163,563 mIU/mL  8 Weeks      32,065 - 149,571 mIU/mL  9 Weeks      63,803 - 151,410 mIU/mL  10 Weeks     46,509 - 186,977 mIU/mL  12 Weeks     27,832 - 210,612 mIU/mL  14 Weeks     13,950 -  62,530 mIU/mL  15 Weeks     12,039 -  70,971 mIU/mL  16 Weeks      9,040 -  56,451 mIU/mL  17 Weeks      8,175 -  55,868 mIU/mL  18 Weeks      8,099 -  58,176 mIU/mL  Results may be falsely decreased if patient taking Biotin.      Comprehensive Metabolic Panel [[id number]] Collected: 02/05/22 1913    Specimen: Blood Updated: 02/05/22 1939     Glucose 93 mg/dL      BUN 11 mg/dL      Creatinine 0.60 mg/dL      Sodium 139 mmol/L      Potassium 4.0 mmol/L      Chloride 101 mmol/L      CO2 26.0 mmol/L      Calcium 9.2 mg/dL      Total Protein 7.2 g/dL      Albumin 4.20 g/dL      ALT (SGPT) 16 U/L      AST (SGOT) 16 U/L       Alkaline Phosphatase 71 U/L      Total Bilirubin 0.3 mg/dL      eGFR Non African Amer 123 mL/min/1.73      Globulin 3.0 gm/dL      A/G Ratio 1.4 g/dL      BUN/Creatinine Ratio 18.3     Anion Gap 12.0 mmol/L     Narrative:      GFR Normal >60  Chronic Kidney Disease <60  Kidney Failure <15      CBC & Differential [[id number]]  (Abnormal) Collected: 02/05/22 1913    Specimen: Blood Updated: 02/05/22 1918    Narrative:      The following orders were created for panel order CBC & Differential.  Procedure                               Abnormality         Status                     ---------                               -----------         ------                     CBC Auto Differential[[id number]]        Abnormal            Final result                 Please view results for these tests on the individual orders.    CBC Auto Differential [[id number]]  (Abnormal) Collected: 02/05/22 1913    Specimen: Blood Updated: 02/05/22 1918     WBC 10.00 10*3/mm3      RBC 4.09 10*6/mm3      Hemoglobin 12.4 g/dL      Hematocrit 36.1 %      MCV 88.3 fL      MCH 30.4 pg      MCHC 34.4 g/dL      RDW 12.9 %      RDW-SD 40.3 fl      MPV 6.8 fL      Platelets 288 10*3/mm3      Neutrophil % 75.7 %      Lymphocyte % 15.1 %      Monocyte % 8.3 %      Eosinophil % 0.6 %      Basophil % 0.3 %      Neutrophils, Absolute 7.50 10*3/mm3      Lymphocytes, Absolute 1.50 10*3/mm3      Monocytes, Absolute 0.80 10*3/mm3      Eosinophils, Absolute 0.10 10*3/mm3      Basophils, Absolute 0.00 10*3/mm3      nRBC 0.0 /100 WBC           Radiology Results:  Imaging Results (Last 72 Hours)     Procedure Component Value Units Date/Time    US Ob Transvaginal [[id number]] Collected: 02/05/22 1958     Updated: 02/05/22 2006    Narrative:      DATE OF EXAM:  2/5/2022 7:36 PM     PROCEDURE:  US OB TRANSVAGINAL-     INDICATIONS:  Evaluate for retained products of conception, ongoing miscarriage, heavy  vaginal bleeding.     COMPARISON:  OB ultrasound dated 01/11/2022      TECHNIQUE:   Transvaginal ultrasound was performed to evaluate pregnancy.  Real time  scanning was performed with multiple image documentation per protocol.       FINDINGS:  The uterus is present and anteverted measuring 10.4 x 4.8 x 5.5 cm.  There is a large echogenic hematoma or blood clot within the lower  uterine segment and cervix measuring 2.3 x 2.3 x 3.6 cm. This is  avascular and without internal color Doppler flow compatible with a  blood clot rather than retained products of conception. The endometrium  near the fundus of the uterus measures 8 mm. There is no evidence of  residual gestational sac.     The right ovary measures 2.2 x 2.1 x 2.3 cm. The left ovary measures 2.5  x 2.5 x 2.0 cm. There is normal color Doppler and spectral Doppler flow  within both ovaries. There is no free fluid within the pelvis.        Impression:      1. Echogenic blood clot within the lower uterine segment and cervix  likely related to ongoing  progress.  2. No residual gestational sac or apparent retained products of  conception.  3. Normal-appearing ovaries with color Doppler flow. No free fluid.     Recommend continued clinical follow-up and follow-up ultrasound as  clinically needed.     Electronically Signed By-Darrick Ulloa MD On:2022 8:04 PM  This report was finalized on [id number] by  Darrick Ulloa MD.          Assessment/Plan       Vaginal bleeding    Incomplete AB.  Patient with known missed AB.  Treated in the office by her nurse practitioner with Cytotec.  Patient presented to the ER last night with vaginal bleeding.  Ultrasound shows nothing in the uterus but some clot in the lower uterine segment and vagina.  Person who cared for the patient in the ER states that this was removed in the emergency department.  Since then, the patient has had minimal spotting.  No cramping.  Feels much better.  Most likely, she has had a complete AB.    She will follow-up in our office for sequential quant.   Annual examination due.  Contraception will be discussed at her next visit.  She will be off work this week.  Note will be given.  She will return to the hospital or to our office with increased vaginal bleeding.  Pelvic rest until then.  Follow-up in 2 weeks for  An office visit.  Assessment and plan discussed with her father and the patient.  They agree with the assessment and plan.    Q for allowing me to participate in this pleasant patient's care.  Please feel free to call with questions.    I discussed the patients findings and my recommendations with patient    Madelyn Duran MD  02/06/22  12:39 EST

## 2023-02-20 ENCOUNTER — LAB (OUTPATIENT)
Dept: LAB | Facility: HOSPITAL | Age: 28
End: 2023-02-20
Payer: COMMERCIAL

## 2023-02-20 ENCOUNTER — HOSPITAL ENCOUNTER (OUTPATIENT)
Dept: CARDIOLOGY | Facility: HOSPITAL | Age: 28
Discharge: HOME | End: 2023-02-20
Payer: COMMERCIAL

## 2023-02-20 ENCOUNTER — TRANSCRIBE ORDERS (OUTPATIENT)
Dept: ADMINISTRATIVE | Facility: HOSPITAL | Age: 28
End: 2023-02-20
Payer: COMMERCIAL

## 2023-02-20 DIAGNOSIS — Z01.818 PRE-OP EXAMINATION: ICD-10-CM

## 2023-02-20 DIAGNOSIS — Z01.818 PRE-OP EXAMINATION: Primary | ICD-10-CM

## 2023-02-20 LAB
ABO GROUP BLD: NORMAL
BASOPHILS # BLD AUTO: 0.03 10*3/MM3 (ref 0–0.2)
BASOPHILS NFR BLD AUTO: 0.4 % (ref 0–1.5)
BLD GP AB SCN SERPL QL: NEGATIVE
DEPRECATED RDW RBC AUTO: 38.4 FL (ref 37–54)
EOSINOPHIL # BLD AUTO: 0.04 10*3/MM3 (ref 0–0.4)
EOSINOPHIL NFR BLD AUTO: 0.5 % (ref 0.3–6.2)
ERYTHROCYTE [DISTWIDTH] IN BLOOD BY AUTOMATED COUNT: 12.2 % (ref 12.3–15.4)
HCT VFR BLD AUTO: 39.3 % (ref 34–46.6)
HGB BLD-MCNC: 13.4 G/DL (ref 12–15.9)
IMM GRANULOCYTES # BLD AUTO: 0.02 10*3/MM3 (ref 0–0.05)
IMM GRANULOCYTES NFR BLD AUTO: 0.3 % (ref 0–0.5)
LYMPHOCYTES # BLD AUTO: 1.91 10*3/MM3 (ref 0.7–3.1)
LYMPHOCYTES NFR BLD AUTO: 24.8 % (ref 19.6–45.3)
MCH RBC QN AUTO: 29.6 PG (ref 26.6–33)
MCHC RBC AUTO-ENTMCNC: 34.1 G/DL (ref 31.5–35.7)
MCV RBC AUTO: 86.8 FL (ref 79–97)
MONOCYTES # BLD AUTO: 0.52 10*3/MM3 (ref 0.1–0.9)
MONOCYTES NFR BLD AUTO: 6.8 % (ref 5–12)
NEUTROPHILS NFR BLD AUTO: 5.18 10*3/MM3 (ref 1.7–7)
NEUTROPHILS NFR BLD AUTO: 67.2 % (ref 42.7–76)
NRBC BLD AUTO-RTO: 0 /100 WBC (ref 0–0.2)
PLATELET # BLD AUTO: 332 10*3/MM3 (ref 140–450)
PMV BLD AUTO: 9.7 FL (ref 6–12)
RBC # BLD AUTO: 4.53 10*6/MM3 (ref 3.77–5.28)
RH BLD: POSITIVE
T&S EXPIRATION DATE: NORMAL
WBC NRBC COR # BLD: 7.7 10*3/MM3 (ref 3.4–10.8)

## 2023-02-20 PROCEDURE — 93010 ELECTROCARDIOGRAM REPORT: CPT | Performed by: INTERNAL MEDICINE

## 2023-02-20 PROCEDURE — 85025 COMPLETE CBC W/AUTO DIFF WBC: CPT

## 2023-02-20 PROCEDURE — 86900 BLOOD TYPING SEROLOGIC ABO: CPT

## 2023-02-20 PROCEDURE — 36415 COLL VENOUS BLD VENIPUNCTURE: CPT

## 2023-02-20 PROCEDURE — 86850 RBC ANTIBODY SCREEN: CPT

## 2023-02-20 PROCEDURE — 86901 BLOOD TYPING SEROLOGIC RH(D): CPT

## 2023-02-20 PROCEDURE — 93005 ELECTROCARDIOGRAM TRACING: CPT | Performed by: ANESTHESIOLOGY

## 2023-02-21 LAB — QT INTERVAL: 418 MS

## 2023-02-27 ENCOUNTER — LAB REQUISITION (OUTPATIENT)
Dept: LAB | Facility: HOSPITAL | Age: 28
End: 2023-02-27
Payer: COMMERCIAL

## 2023-02-27 DIAGNOSIS — Z30.2 ENCOUNTER FOR STERILIZATION: ICD-10-CM

## 2023-02-27 PROCEDURE — 88302 TISSUE EXAM BY PATHOLOGIST: CPT | Performed by: OBSTETRICS & GYNECOLOGY

## 2023-02-28 LAB
LAB AP CASE REPORT: NORMAL
PATH REPORT.FINAL DX SPEC: NORMAL
PATH REPORT.GROSS SPEC: NORMAL